# Patient Record
Sex: FEMALE | Race: WHITE | NOT HISPANIC OR LATINO | Employment: FULL TIME | URBAN - METROPOLITAN AREA
[De-identification: names, ages, dates, MRNs, and addresses within clinical notes are randomized per-mention and may not be internally consistent; named-entity substitution may affect disease eponyms.]

---

## 2018-01-10 ENCOUNTER — OFFICE VISIT (OUTPATIENT)
Dept: URGENT CARE | Facility: CLINIC | Age: 48
End: 2018-01-10
Payer: COMMERCIAL

## 2018-01-10 PROCEDURE — 87430 STREP A AG IA: CPT

## 2018-01-10 PROCEDURE — G0381 LEV 2 HOSP TYPE B ED VISIT: HCPCS

## 2018-01-10 PROCEDURE — 99282 EMERGENCY DEPT VISIT SF MDM: CPT

## 2018-01-12 NOTE — PROGRESS NOTES
Assessment   1  Acute pharyngitis (462) (J02 9)  2  Aphthous ulcer (528 2) (K12 0)  3  Dysfunction of left eustachian tube (381 81) (H69 82)    Plan   Dysfunction of left eustachian tube    · Start: Fluticasone Propionate 50 MCG/ACT Nasal Suspension; USE 2 SPRAYS IN EACH    NOSTRIL ONCE DAILY    Discussion/Summary   Discussion Summary:    A rapid strep test was performed in the office and was negative  Flonase nasal spray as directed   gargles versus Chloraseptic spray as needed for sore throat symptoms  your fluids  up with your family doctor symptoms persist or worsen      Medication Side Effects Reviewed: Possible side effects of new medications were reviewed with the patient/guardian today  Understands and agrees with treatment plan: The treatment plan was reviewed with the patient/guardian  The patient/guardian understands and agrees with the treatment plan      Chief Complaint   1  Sore Throat  Chief Complaint Free Text Note Form: Patient relates on Wednesday oil furnace kicked back soot causing her to have a sore throat and left ear feels clogged  History of Present Illness   HPI: patient presents with complaints of a sore throat and left ear feeling clogged the last 2 days  She relates that a week ago her will furnace backed up and there was slight in the house  At that time when she blew her nose she did get some black secretions  That has since resolved  She is unsure if she has been running any fevers  Her last dose of Advil was this morning  She complains of a painful swallow  She denies any shortness of breath or significant cough  She is a nonsmoker    Hospital Based Practices Required Assessment: Reason DV Screen not done: family at bedside       Depression And Suicide Screen  Reason suicide screen not done: family at bedside  Review of Systems   Focused-Female:      Constitutional: feeling poorly  ENT: earache,-- sore throat-- and-- nasal discharge        Cardiovascular: no complaints of slow or fast heart rate, no chest pain, no palpitations, no leg claudication or lower extremity edema  Respiratory: no complaints of shortness of breath, no wheezing, no dyspnea on exertion, no orthopnea or PND  Gastrointestinal: no complaints of abdominal pain, no constipation, no nausea or diarrhea, no vomiting, no bloody stools  Active Problems   1  Clogged ear (388 8) (H93 8X9)  2  Sore throat (462) (J02 9)    Past Medical History   1  No pertinent past medical history  Active Problems And Past Medical History Reviewed: The active problems and past medical history were reviewed and updated today  Family History   Mother   1  No pertinent family history  Father   2  No pertinent family history  Family History Reviewed: The family history was reviewed and updated today  Social History    · Never smoker  Social History Reviewed: The social history was reviewed and updated today  Surgical History   1  History of  Section  Surgical History Reviewed: The surgical history was reviewed and updated today  Current Meds   1  Ambien 5 MG Oral Tablet; Therapy: (Recorded:2018) to Recorded  2  Flonase 50 MCG/ACT SUSP; Therapy: (Recorded:2018) to Recorded  3  Wellbutrin  MG Oral Tablet Extended Release 12 Hour; Therapy: (Recorded:2018) to Recorded  Medication List Reviewed: The medication list was reviewed and updated today  Allergies   1  No Known Drug Allergies    Vitals   Signs   Recorded: [id number] 05:57PM   Temperature: 98 7 F, Tympanic  Heart Rate: 86  Respiration: 16  Systolic: 790, LUE, Sitting  Diastolic: 75, LUE, Sitting  Height: 5 ft 1 in  Weight: 136 lb   BMI Calculated: 25 7  BSA Calculated: 1 6  O2 Saturation: 98, RA  Pain Scale: 0    Physical Exam        Constitutional      General appearance: No acute distress, well appearing and well nourished         Eyes      Conjunctiva and lids: No swelling, erythema or discharge  Pupils and irises: Equal, round and reactive to light  Ears, Nose, Mouth, and Throat      External inspection of ears and nose: Normal        Otoscopic examination: Abnormal  -- left ear effusion  No erythema no bulge  Right TM normal       Nasal mucosa, septum, and turbinates: Abnormal  -- Nasal congestion  Oropharynx: Abnormal  -- [facility]aryngeal in from [facility]  Small aphthous ulcer noted along the left side of the uvula  Pulmonary      Respiratory effort: No increased work of breathing or signs of respiratory distress  Auscultation of lungs: Clear to auscultation  Cardiovascular      Palpation of heart: Normal PMI, no thrills  Auscultation of heart: Normal rate and rhythm, normal S1 and S2, without murmurs  Examination of extremities for edema and/or varicosities: Normal        Message   Return to work or school:    Jennie Dalton is under my professional care  She was seen in my office on 01/10/2018    She is able to return to work on  01/12/2008          MARY Black        Signatures    Electronically signed by : Sam Figueroa DO; Humble 10 2018  6:13PM EST                       (Author)     Electronically signed by : Sam Figueroa DO; Humble 10 2018  6:56PM EST                       (Author)

## 2018-01-23 VITALS
SYSTOLIC BLOOD PRESSURE: 111 MMHG | TEMPERATURE: 98.7 F | DIASTOLIC BLOOD PRESSURE: 75 MMHG | BODY MASS INDEX: 25.68 KG/M2 | HEIGHT: 61 IN | OXYGEN SATURATION: 98 % | HEART RATE: 86 BPM | WEIGHT: 136 LBS | RESPIRATION RATE: 16 BRPM

## 2018-01-24 NOTE — MISCELLANEOUS
Message  Return to work or school:   Meron Carter is under my professional care  She was seen in my office on 01/10/2018   She is able to return to work on  01/12/2008       MARY Diaz        Signatures   Electronically signed by : Dudley Roman DO; Humble 10 2018  6:13PM EST                       (Author)    Electronically signed by : Dudley Roman DO; Humble 10 2018  6:56PM EST                       (Author)

## 2022-09-21 ENCOUNTER — TELEPHONE (OUTPATIENT)
Dept: FAMILY MEDICINE CLINIC | Facility: CLINIC | Age: 52
End: 2022-09-21

## 2022-09-21 NOTE — TELEPHONE ENCOUNTER
This patient left a message that she would like to be a new patient of yours  Please return her call to schedule an appointment

## 2022-09-22 NOTE — TELEPHONE ENCOUNTER
Contacted the Client left a message for the Client to contact this therapist at the [address] [place] [address] [phone]

## 2022-12-06 ENCOUNTER — DOCUMENTATION (OUTPATIENT)
Dept: BEHAVIORAL/MENTAL HEALTH CLINIC | Facility: CLINIC | Age: 52
End: 2022-12-06

## 2022-12-06 NOTE — PROGRESS NOTES
Client missed or no showed 2 intake appointments  She contacted this office and reported that an MA told her to call the office this week to reschedule her intake with this therapist  Appointment was scheduled for 1/31/22  [address] [place] [address] Pkwy reported that she did not talk with this Patient    Patient was notified if she cancels or no shows her appointment she not be rescheduled

## 2023-02-01 ENCOUNTER — TELEPHONE (OUTPATIENT)
Dept: BEHAVIORAL/MENTAL HEALTH CLINIC | Facility: CLINIC | Age: 53
End: 2023-02-01

## 2023-02-01 NOTE — TELEPHONE ENCOUNTER
Attempted to return phone call to the Client to reschedule her appointment due to therapist being sick  Left message for her to contact this therapist at the [address] [place] [address]

## 2025-03-19 ENCOUNTER — OFFICE VISIT (OUTPATIENT)
Dept: URGENT CARE | Facility: CLINIC | Age: 55
End: 2025-03-19
Payer: MEDICARE

## 2025-03-19 VITALS
TEMPERATURE: 99.9 F | SYSTOLIC BLOOD PRESSURE: 132 MMHG | RESPIRATION RATE: 16 BRPM | BODY MASS INDEX: 19.63 KG/M2 | HEART RATE: 112 BPM | OXYGEN SATURATION: 98 % | DIASTOLIC BLOOD PRESSURE: 80 MMHG | HEIGHT: 61 IN | WEIGHT: 104 LBS

## 2025-03-19 DIAGNOSIS — R68.89 FLU-LIKE SYMPTOMS: Primary | ICD-10-CM

## 2025-03-19 PROCEDURE — 87636 SARSCOV2 & INF A&B AMP PRB: CPT

## 2025-03-19 PROCEDURE — G0463 HOSPITAL OUTPT CLINIC VISIT: HCPCS

## 2025-03-19 PROCEDURE — 99202 OFFICE O/P NEW SF 15 MIN: CPT

## 2025-03-19 RX ORDER — ESTRADIOL 0.5 MG/1
0.5 TABLET ORAL 2 TIMES DAILY
COMMUNITY

## 2025-03-19 RX ORDER — GABAPENTIN 300 MG/1
300 CAPSULE ORAL DAILY
COMMUNITY
Start: 2025-02-19

## 2025-03-19 RX ORDER — DEXTROMETHORPHAN HYDROBROMIDE, BUPROPION HYDROCHLORIDE 105; 45 MG/1; MG/1
1 TABLET, MULTILAYER, EXTENDED RELEASE ORAL 2 TIMES DAILY
COMMUNITY
Start: 2025-02-19

## 2025-03-19 RX ORDER — TOPIRAMATE 100 MG/1
1 TABLET, FILM COATED ORAL
COMMUNITY
Start: 2024-10-16

## 2025-03-19 RX ORDER — CHOLECALCIFEROL (VITAMIN D3) 25 MCG
1000 TABLET ORAL EVERY MORNING
COMMUNITY

## 2025-03-19 RX ORDER — MEDROXYPROGESTERONE ACETATE 5 MG
1 TABLET ORAL DAILY
COMMUNITY
Start: 2025-02-17

## 2025-03-19 RX ORDER — CLONAZEPAM 0.5 MG/1
0.5 TABLET ORAL DAILY PRN
COMMUNITY

## 2025-03-19 RX ORDER — LORATADINE 10 MG/1
10 TABLET ORAL DAILY PRN
COMMUNITY
Start: 2024-12-22

## 2025-03-19 RX ORDER — MULTIVITAMIN WITH IRON
1 TABLET ORAL EVERY MORNING
COMMUNITY
Start: 2025-02-18

## 2025-03-19 RX ORDER — ARIPIPRAZOLE 5 MG/1
5 TABLET ORAL DAILY
COMMUNITY
Start: 2025-02-19

## 2025-03-19 NOTE — PROGRESS NOTES
"Name: Elana Alanis      : 1970      MRN: [id number]  Encounter Provider: Shefali Garrido PA-C  Encounter Date: 3/19/2025   Encounter department: [facility]  :  Assessment & Plan  Flu-like symptoms    Orders:    Covid/Flu- Office Collect Normal; Future    Fever, aches, and URI symptoms concerning for covid/flu. Swab sent out and will f/u regarding results. Reviewed OTC options for mucus as below and advised contact precautions:  Instructions:   Utilize saline nasal spray OTC STRAIGHT down each nostril as often as needed  Utilize Flonase nasal spray with steroids OTC ANGLED towards your sinuses 2 times a day as needed  Maintain Tylenol every 6 hours as needed, do not exceed six, 500 mg doses in a 24 hour time period for fever, aches, and chills  Mucinex, blue box, consistently as written on the box can help break up mucus well  Hydrate, hydrate, hydrate  If symptoms worsen or do not start resolving with the week, please contact PCP or consider another visit with our team      History of Present Illness   HPI  Elana Alanis is a 55 y.o. female who presents with cough, congestion, achy and feverish x 3 days. She is concerned for covid/flu given young 7 month old grandchild at home despite getting her flu shot.          Review of Systems   Constitutional:  Positive for fever.   HENT:  Positive for congestion.    Respiratory:  Positive for cough.    Musculoskeletal:  Positive for arthralgias and myalgias.          Objective   /80   Pulse (!) 112   Temp 99.9 °F (37.7 °C)   Resp 16   Ht 5' 1\" (1.549 m)   Wt 47.2 kg (104 lb)   SpO2 98%   BMI 19.65 kg/m²      Physical Exam  Constitutional:       Appearance: She is ill-appearing.   HENT:      Head: Normocephalic and atraumatic.      Right Ear: Tympanic membrane and ear canal normal.      Left Ear: Tympanic membrane and ear canal normal.      Nose: Congestion present.      Mouth/Throat:      Mouth: Mucous membranes " are moist.   Cardiovascular:      Rate and Rhythm: Normal rate.   Pulmonary:      Effort: Pulmonary effort is normal.      Breath sounds: Normal breath sounds.   Neurological:      Mental Status: She is alert.

## 2025-03-19 NOTE — PATIENT INSTRUCTIONS
Thank you for trusting your health with [facility].    Please review the following instructions and return to our clinic or consider an Emergency Department visit for worsening or severe symptoms.      Instructions:   Utilize saline nasal spray OTC STRAIGHT down each nostril as often as needed  Utilize Flonase nasal spray with steroids OTC ANGLED towards your sinuses 2 times a day as needed  Maintain Tylenol every 6 hours as needed, do not exceed six, 500 mg doses in a 24 hour time period for fever, aches, and chills  Mucinex, blue box, consistently as written on the box can help break up mucus well  Hydrate, hydrate, hydrate  If symptoms worsen or do not start resolving with the week, please contact PCP or consider another visit with our team

## 2025-03-20 LAB
FLUAV RNA RESP QL NAA+PROBE: POSITIVE
FLUBV RNA RESP QL NAA+PROBE: NEGATIVE
SARS-COV-2 RNA RESP QL NAA+PROBE: NEGATIVE